# Patient Record
Sex: FEMALE | Race: OTHER | NOT HISPANIC OR LATINO | URBAN - METROPOLITAN AREA
[De-identification: names, ages, dates, MRNs, and addresses within clinical notes are randomized per-mention and may not be internally consistent; named-entity substitution may affect disease eponyms.]

---

## 2017-06-10 ENCOUNTER — EMERGENCY (EMERGENCY)
Facility: HOSPITAL | Age: 57
LOS: 0 days | Discharge: HOME | End: 2017-06-10
Admitting: INTERNAL MEDICINE

## 2017-06-10 DIAGNOSIS — B96.89 OTHER SPECIFIED BACTERIAL AGENTS AS THE CAUSE OF DISEASES CLASSIFIED ELSEWHERE: ICD-10-CM

## 2017-06-10 DIAGNOSIS — Z88.1 ALLERGY STATUS TO OTHER ANTIBIOTIC AGENTS STATUS: ICD-10-CM

## 2017-06-10 DIAGNOSIS — Z90.49 ACQUIRED ABSENCE OF OTHER SPECIFIED PARTS OF DIGESTIVE TRACT: ICD-10-CM

## 2017-06-10 DIAGNOSIS — S06.0X0A CONCUSSION WITHOUT LOSS OF CONSCIOUSNESS, INITIAL ENCOUNTER: ICD-10-CM

## 2017-06-10 DIAGNOSIS — Z91.048 OTHER NONMEDICINAL SUBSTANCE ALLERGY STATUS: ICD-10-CM

## 2017-06-10 DIAGNOSIS — N61.0 MASTITIS WITHOUT ABSCESS: ICD-10-CM

## 2019-09-30 PROBLEM — Z00.00 ENCOUNTER FOR PREVENTIVE HEALTH EXAMINATION: Status: ACTIVE | Noted: 2019-09-30

## 2019-10-08 ENCOUNTER — APPOINTMENT (OUTPATIENT)
Dept: GYNECOLOGIC ONCOLOGY | Facility: CLINIC | Age: 59
End: 2019-10-08

## 2021-12-21 ENCOUNTER — APPOINTMENT (OUTPATIENT)
Age: 61
End: 2021-12-21
Payer: MEDICARE

## 2021-12-21 PROCEDURE — 99441: CPT | Mod: 95

## 2021-12-21 NOTE — REASON FOR VISIT
[Home] : at home, [unfilled] , at the time of the visit. [Medical Office: ([facility])___] : at the medical office located in  [Verbal consent obtained from patient] : the patient, [unfilled] [Follow-Up] : a follow-up visit [Asthma] : asthma

## 2022-02-03 RX ORDER — AMOXICILLIN 875 MG/1
875 TABLET, FILM COATED ORAL
Qty: 20 | Refills: 0 | Status: ACTIVE | COMMUNITY
Start: 2021-12-21 | End: 1900-01-01

## 2022-07-12 ENCOUNTER — APPOINTMENT (OUTPATIENT)
Age: 62
End: 2022-07-12

## 2022-07-12 DIAGNOSIS — J06.9 ACUTE UPPER RESPIRATORY INFECTION, UNSPECIFIED: ICD-10-CM

## 2022-07-12 PROCEDURE — 99442: CPT | Mod: 95

## 2022-07-12 RX ORDER — AMOXICILLIN 875 MG/1
875 TABLET, FILM COATED ORAL
Qty: 20 | Refills: 0 | Status: ACTIVE | COMMUNITY
Start: 2022-07-12 | End: 1900-01-01

## 2022-07-12 RX ORDER — ALBUTEROL SULFATE 90 UG/1
108 (90 BASE) INHALANT RESPIRATORY (INHALATION) EVERY 4 HOURS
Qty: 1 | Refills: 3 | Status: ACTIVE | COMMUNITY
Start: 2022-07-12 | End: 1900-01-01

## 2022-07-12 NOTE — REASON FOR VISIT
[Home] : at home, [unfilled] , at the time of the visit. [Medical Office: ([facility] [place])___] : at the medical office located in  [Verbal consent obtained from patient] : the patient, [unfilled] [Follow-Up] : a follow-up visit [Cough] : cough

## 2022-11-11 ENCOUNTER — APPOINTMENT (OUTPATIENT)
Age: 62
End: 2022-11-11

## 2022-11-11 PROCEDURE — 99442: CPT | Mod: 95

## 2022-11-11 NOTE — REASON FOR VISIT
[Home] : at home, [unfilled] , at the time of the visit. [Medical Office: ([facility])___] : at the medical office located in  [Verbal consent obtained from patient] : the patient, [unfilled] [Follow-Up] : a follow-up visit [Cough] : cough [Shortness of Breath] : shortness of breath

## 2023-02-01 ENCOUNTER — APPOINTMENT (OUTPATIENT)
Age: 63
End: 2023-02-01
Payer: MEDICARE

## 2023-02-01 DIAGNOSIS — J45.909 UNSPECIFIED ASTHMA, UNCOMPLICATED: ICD-10-CM

## 2023-02-01 DIAGNOSIS — J01.90 ACUTE SINUSITIS, UNSPECIFIED: ICD-10-CM

## 2023-02-01 PROCEDURE — 99442: CPT | Mod: 95

## 2023-02-01 RX ORDER — ALBUTEROL SULFATE 90 UG/1
108 (90 BASE) INHALANT RESPIRATORY (INHALATION) EVERY 4 HOURS
Qty: 1 | Refills: 5 | Status: ACTIVE | COMMUNITY
Start: 2023-02-01 | End: 1900-01-01

## 2023-02-01 RX ORDER — AMOXICILLIN AND CLAVULANATE POTASSIUM 875; 125 MG/1; MG/1
875-125 TABLET, COATED ORAL
Qty: 20 | Refills: 0 | Status: ACTIVE | COMMUNITY
Start: 2023-02-01 | End: 1900-01-01

## 2023-02-01 NOTE — REASON FOR VISIT
[Home] : at home, [unfilled] , at the time of the visit. [Medical Office: ([facility])___] : at the medical office located in  [Verbal consent obtained from patient] : the patient, [unfilled] [Follow-Up] : a follow-up visit

## 2024-01-19 RX ORDER — ALBUTEROL SULFATE 90 UG/1
108 (90 BASE) INHALANT RESPIRATORY (INHALATION) EVERY 4 HOURS
Qty: 1 | Refills: 5 | Status: ACTIVE | COMMUNITY
Start: 2022-11-11 | End: 1900-01-01

## 2024-05-30 RX ORDER — AMOXICILLIN AND CLAVULANATE POTASSIUM 875; 125 MG/1; MG/1
875-125 TABLET, COATED ORAL
Qty: 20 | Refills: 0 | Status: ACTIVE | COMMUNITY
Start: 2022-11-11 | End: 1900-01-01

## 2024-08-06 ENCOUNTER — NON-APPOINTMENT (OUTPATIENT)
Age: 64
End: 2024-08-06

## 2024-08-29 ENCOUNTER — APPOINTMENT (OUTPATIENT)
Dept: ORTHOPEDIC SURGERY | Facility: CLINIC | Age: 64
End: 2024-08-29
Payer: MEDICARE

## 2024-08-29 DIAGNOSIS — M25.562 PAIN IN LEFT KNEE: ICD-10-CM

## 2024-08-29 PROCEDURE — 99203 OFFICE O/P NEW LOW 30 MIN: CPT

## 2024-08-29 PROCEDURE — 73562 X-RAY EXAM OF KNEE 3: CPT | Mod: 50

## 2024-08-29 NOTE — HISTORY OF PRESENT ILLNESS
[de-identified] : Patient here for evaluation left knee pain. Complains of joint line pain Positive mechanical symptoms and catching  NAD Left knee No skin breakdown Medial joint line ttp Positive [place]ina Negative lachman Negative varus/valgus instability ROM 0-130 Pain with forced extension and flexion NVI Compartments soft and NT  Xray reviewed and significant for left knee mild degenerative changes, chronic mcl changes  Plan went over findings explained the imaging needs an mri left knee to evaluate meniscus pt pain control fu after mri

## 2024-11-05 ENCOUNTER — APPOINTMENT (OUTPATIENT)
Dept: ORTHOPEDIC SURGERY | Facility: CLINIC | Age: 64
End: 2024-11-05

## 2024-11-27 RX ORDER — AMOXICILLIN AND CLAVULANATE POTASSIUM 875; 125 MG/1; 1/1
875-125 TABLET, FILM COATED ORAL
Refills: 0 | Status: ACTIVE | COMMUNITY